# Patient Record
Sex: FEMALE | Race: WHITE | NOT HISPANIC OR LATINO | Employment: FULL TIME | URBAN - METROPOLITAN AREA
[De-identification: names, ages, dates, MRNs, and addresses within clinical notes are randomized per-mention and may not be internally consistent; named-entity substitution may affect disease eponyms.]

---

## 2019-11-15 ENCOUNTER — SOCIAL WORK (OUTPATIENT)
Dept: ADMINISTRATIVE | Facility: OTHER | Age: 34
End: 2019-11-15

## 2019-11-15 NOTE — PROGRESS NOTES
SW met with pt regarding initial OB assessment. Pt stated this is her 2nd pregnancy/1-miscarriage. Pt stated lives with her  and able to perform ADL's independently. Pt stated support system is her /[employer]. Pt stated has([employer])insurance. Pt stated does not have WIC. SW provide pt with information on other community resources.  No other needs identified at this time.    Jacqueline Lancaster,MSW  Pager#[phone]

## 2020-02-03 PROBLEM — O09.522 AMA (ADVANCED MATERNAL AGE) MULTIGRAVIDA 35+, SECOND TRIMESTER: Status: ACTIVE | Noted: 2020-02-03

## 2020-03-10 PROBLEM — R03.0 ELEVATED BLOOD PRESSURE READING: Status: ACTIVE | Noted: 2020-03-10

## 2020-03-10 PROBLEM — Z3A.24 24 WEEKS GESTATION OF PREGNANCY: Status: ACTIVE | Noted: 2020-03-10

## 2020-03-17 PROBLEM — K64.9 HEMORRHOIDS: Status: ACTIVE | Noted: 2020-03-17

## 2020-03-17 PROBLEM — O13.9 GESTATIONAL HYPERTENSION AFFECTING FIRST PREGNANCY: Status: ACTIVE | Noted: 2020-03-17

## 2020-03-20 PROBLEM — Z3A.26 26 WEEKS GESTATION OF PREGNANCY: Status: ACTIVE | Noted: 2020-03-10

## 2020-03-24 PROBLEM — O13.2 GESTATIONAL HYPERTENSION, SECOND TRIMESTER: Status: ACTIVE | Noted: 2020-03-17

## 2020-03-24 PROBLEM — R03.0 ELEVATED BLOOD PRESSURE READING: Status: RESOLVED | Noted: 2020-03-10 | Resolved: 2020-03-24

## 2020-03-30 PROBLEM — O11.9 PRE-ECLAMPSIA SUPERIMPOSED ON CHRONIC HYPERTENSION: Status: ACTIVE | Noted: 2020-03-30

## 2020-03-30 PROBLEM — O10.919 CHRONIC HYPERTENSION AFFECTING PREGNANCY: Status: ACTIVE | Noted: 2020-03-17

## 2020-03-30 PROBLEM — O28.8 EQUIVOCAL NON-STRESS TEST: Status: ACTIVE | Noted: 2020-03-30

## 2020-03-30 PROBLEM — Z3A.27 27 WEEKS GESTATION OF PREGNANCY: Status: ACTIVE | Noted: 2020-03-10

## 2020-03-30 PROBLEM — O14.92 PREECLAMPSIA, SECOND TRIMESTER: Status: ACTIVE | Noted: 2020-03-30

## 2020-03-31 ENCOUNTER — SOCIAL WORK (OUTPATIENT)
Dept: ADMINISTRATIVE | Facility: OTHER | Age: 35
End: 2020-03-31

## 2020-03-31 PROBLEM — O14.12 PREECLAMPSIA, SEVERE, SECOND TRIMESTER: Status: ACTIVE | Noted: 2020-03-30

## 2020-03-31 PROBLEM — O99.810 ABNORMAL O'SULLIVAN GLUCOSE CHALLENGE TEST, ANTEPARTUM: Status: ACTIVE | Noted: 2020-03-31

## 2020-03-31 NOTE — PROGRESS NOTES
SW received pt's FMLA paperwork from pt's spouse. SW completed the demographic sheet/attached clinical note on pt's FMLA paperwork and gave the paperwork to MD for review/signature. SW later received pt's completed paperwork from MD and provide pt's spouse with the original paperwork. SW scanned paperwork into epic. No other needs identified at this time.    Jacqueline Lancaster,MSW  Pager#[phone]

## 2020-04-03 PROBLEM — Z3A.27 27 WEEKS GESTATION OF PREGNANCY: Status: RESOLVED | Noted: 2020-03-10 | Resolved: 2020-04-03

## 2020-04-04 PROBLEM — O99.810 ABNORMAL O'SULLIVAN GLUCOSE CHALLENGE TEST, ANTEPARTUM: Status: RESOLVED | Noted: 2020-03-31 | Resolved: 2020-04-04

## 2020-04-04 PROBLEM — F41.9 ANXIETY DISORDER, UNSPECIFIED: Status: ACTIVE | Noted: 2020-04-04

## 2020-04-04 PROBLEM — O28.8 EQUIVOCAL NON-STRESS TEST: Status: RESOLVED | Noted: 2020-03-30 | Resolved: 2020-04-04

## 2020-04-04 PROBLEM — O09.522 AMA (ADVANCED MATERNAL AGE) MULTIGRAVIDA 35+, SECOND TRIMESTER: Status: RESOLVED | Noted: 2020-02-03 | Resolved: 2020-04-04

## 2020-04-04 PROBLEM — O10.919 CHRONIC HYPERTENSION AFFECTING PREGNANCY: Status: RESOLVED | Noted: 2020-03-17 | Resolved: 2020-04-04

## 2020-04-04 PROBLEM — O14.13 PREECLAMPSIA, SEVERE, THIRD TRIMESTER: Status: ACTIVE | Noted: 2020-03-30

## 2020-04-04 PROBLEM — F32.A DEPRESSIVE DISORDER: Status: ACTIVE | Noted: 2020-04-04

## 2020-04-04 PROBLEM — F41.8 POSTPARTUM ANXIETY: Status: ACTIVE | Noted: 2020-04-04

## 2020-04-04 PROBLEM — E83.51 HYPOCALCEMIA: Status: ACTIVE | Noted: 2020-04-04

## 2020-05-12 PROBLEM — I10 CHRONIC HYPERTENSION: Status: ACTIVE | Noted: 2020-05-12

## 2020-06-09 PROBLEM — Z01.30 BLOOD PRESSURE CHECK: Status: ACTIVE | Noted: 2020-06-09

## 2020-12-09 PROBLEM — E83.51 HYPOCALCEMIA: Status: RESOLVED | Noted: 2020-04-04 | Resolved: 2020-12-09

## 2020-12-09 PROBLEM — O14.13 PREECLAMPSIA, SEVERE, THIRD TRIMESTER: Status: RESOLVED | Noted: 2020-03-30 | Resolved: 2020-12-09

## 2021-06-16 PROBLEM — Z01.419 WELL WOMAN EXAM: Status: ACTIVE | Noted: 2021-06-16

## 2021-06-16 PROBLEM — K64.9 HEMORRHOIDS: Status: RESOLVED | Noted: 2020-03-17 | Resolved: 2021-06-16

## 2021-06-16 PROBLEM — F32.A DEPRESSIVE DISORDER: Status: RESOLVED | Noted: 2020-04-04 | Resolved: 2021-06-16

## 2021-06-16 PROBLEM — F41.9 ANXIETY DISORDER, UNSPECIFIED: Status: RESOLVED | Noted: 2020-04-04 | Resolved: 2021-06-16

## 2021-11-17 PROBLEM — R80.9 PROTEINURIA: Status: ACTIVE | Noted: 2021-11-17

## 2022-01-27 PROBLEM — R80.9 PROTEINURIA: Status: RESOLVED | Noted: 2021-11-17 | Resolved: 2022-01-27

## 2022-01-27 PROBLEM — Z01.30 BLOOD PRESSURE CHECK: Status: RESOLVED | Noted: 2020-06-09 | Resolved: 2022-01-27

## 2022-01-27 PROBLEM — Z01.419 WELL WOMAN EXAM: Status: RESOLVED | Noted: 2021-06-16 | Resolved: 2022-01-27

## 2022-01-27 PROBLEM — Z87.59 HISTORY OF SEVERE PRE-ECLAMPSIA: Status: ACTIVE | Noted: 2022-01-27

## 2022-05-03 PROBLEM — O09.299 HX OF PREECLAMPSIA, PRIOR PREGNANCY, CURRENTLY PREGNANT: Status: ACTIVE | Noted: 2022-01-27

## 2022-05-03 PROBLEM — Z87.51 HISTORY OF PRETERM DELIVERY: Status: ACTIVE | Noted: 2022-05-03

## 2022-05-03 PROBLEM — N91.2 AMENORRHEA: Status: ACTIVE | Noted: 2022-05-03

## 2022-07-20 PROBLEM — Z3A.16 16 WEEKS GESTATION OF PREGNANCY: Status: ACTIVE | Noted: 2022-07-20

## 2022-07-20 PROBLEM — N91.2 AMENORRHEA: Status: RESOLVED | Noted: 2022-05-03 | Resolved: 2022-07-20

## 2022-08-09 PROBLEM — O34.219 PREVIOUS CESAREAN DELIVERY AFFECTING PREGNANCY: Status: ACTIVE | Noted: 2022-08-09

## 2022-08-18 PROBLEM — Z33.1 PREGNANCY, NORMAL, INCIDENTAL: Status: ACTIVE | Noted: 2022-08-18

## 2022-08-18 PROBLEM — Z3A.20 20 WEEKS GESTATION OF PREGNANCY: Status: ACTIVE | Noted: 2022-08-18

## 2022-08-24 PROBLEM — Z3A.21 21 WEEKS GESTATION OF PREGNANCY: Status: ACTIVE | Noted: 2022-08-24

## 2022-09-14 PROBLEM — Z3A.24 24 WEEKS GESTATION OF PREGNANCY: Status: ACTIVE | Noted: 2022-09-14

## 2022-10-19 PROBLEM — Z3A.29 29 WEEKS GESTATION OF PREGNANCY: Status: ACTIVE | Noted: 2022-10-19

## 2022-10-26 PROBLEM — Z3A.30 30 WEEKS GESTATION OF PREGNANCY: Status: ACTIVE | Noted: 2022-10-26

## 2022-11-02 PROBLEM — Z3A.31 31 WEEKS GESTATION OF PREGNANCY: Status: ACTIVE | Noted: 2022-11-02

## 2022-11-16 PROBLEM — Z3A.33 33 WEEKS GESTATION OF PREGNANCY: Status: ACTIVE | Noted: 2022-11-16

## 2022-12-14 PROBLEM — O14.93 PRE-ECLAMPSIA IN THIRD TRIMESTER: Status: ACTIVE | Noted: 2020-03-30

## 2022-12-14 PROBLEM — Z3A.37 37 WEEKS GESTATION OF PREGNANCY: Status: ACTIVE | Noted: 2022-12-14

## 2022-12-16 PROBLEM — Z98.891 STATUS POST CESAREAN DELIVERY: Status: ACTIVE | Noted: 2022-08-09

## 2023-03-17 ENCOUNTER — PATIENT MESSAGE (OUTPATIENT)
Dept: RESEARCH | Facility: HOSPITAL | Age: 38
End: 2023-03-17

## 2023-03-20 PROBLEM — O14.93 PRE-ECLAMPSIA IN THIRD TRIMESTER: Status: RESOLVED | Noted: 2020-03-30 | Resolved: 2023-03-20

## 2023-04-10 PROBLEM — O09.299 HX OF PREECLAMPSIA, PRIOR PREGNANCY, CURRENTLY PREGNANT: Status: RESOLVED | Noted: 2022-01-27 | Resolved: 2023-04-10

## 2023-06-26 PROBLEM — Z3A.16 16 WEEKS GESTATION OF PREGNANCY: Status: RESOLVED | Noted: 2022-07-20 | Resolved: 2023-06-26

## 2023-07-31 PROBLEM — Z3A.20 20 WEEKS GESTATION OF PREGNANCY: Status: RESOLVED | Noted: 2022-08-18 | Resolved: 2023-07-31

## 2023-07-31 PROBLEM — Z33.1 PREGNANCY, NORMAL, INCIDENTAL: Status: RESOLVED | Noted: 2022-08-18 | Resolved: 2023-07-31

## 2023-07-31 PROBLEM — Z3A.21 21 WEEKS GESTATION OF PREGNANCY: Status: RESOLVED | Noted: 2022-08-24 | Resolved: 2023-07-31

## 2023-08-21 PROBLEM — Z3A.24 24 WEEKS GESTATION OF PREGNANCY: Status: RESOLVED | Noted: 2022-09-14 | Resolved: 2023-08-21

## 2023-09-25 PROBLEM — Z3A.29 29 WEEKS GESTATION OF PREGNANCY: Status: RESOLVED | Noted: 2022-10-19 | Resolved: 2023-09-25

## 2023-10-02 PROBLEM — Z3A.30 30 WEEKS GESTATION OF PREGNANCY: Status: RESOLVED | Noted: 2022-10-26 | Resolved: 2023-10-02

## 2023-10-09 PROBLEM — Z3A.31 31 WEEKS GESTATION OF PREGNANCY: Status: RESOLVED | Noted: 2022-11-02 | Resolved: 2023-10-09

## 2023-10-23 PROBLEM — Z3A.33 33 WEEKS GESTATION OF PREGNANCY: Status: RESOLVED | Noted: 2022-11-16 | Resolved: 2023-10-23

## 2023-11-27 PROBLEM — Z3A.37 37 WEEKS GESTATION OF PREGNANCY: Status: RESOLVED | Noted: 2022-12-14 | Resolved: 2023-11-27
